# Patient Record
Sex: FEMALE | URBAN - METROPOLITAN AREA
[De-identification: names, ages, dates, MRNs, and addresses within clinical notes are randomized per-mention and may not be internally consistent; named-entity substitution may affect disease eponyms.]

---

## 2024-01-11 ENCOUNTER — OFFICE VISIT (OUTPATIENT)
Dept: URBAN - METROPOLITAN AREA CLINIC 2 | Facility: CLINIC | Age: 24
End: 2024-01-11

## 2024-02-19 ENCOUNTER — OV CON (OUTPATIENT)
Dept: URBAN - METROPOLITAN AREA CLINIC 2 | Facility: CLINIC | Age: 24
End: 2024-02-19

## 2024-04-15 ENCOUNTER — OV NP (OUTPATIENT)
Dept: URBAN - METROPOLITAN AREA CLINIC 2 | Facility: CLINIC | Age: 24
End: 2024-04-15
Payer: MEDICAID

## 2024-04-15 VITALS
SYSTOLIC BLOOD PRESSURE: 123 MMHG | WEIGHT: 222.8 LBS | TEMPERATURE: 98 F | HEART RATE: 97 BPM | BODY MASS INDEX: 38.04 KG/M2 | HEIGHT: 64 IN | DIASTOLIC BLOOD PRESSURE: 84 MMHG

## 2024-04-15 DIAGNOSIS — R12 HEARTBURN: ICD-10-CM

## 2024-04-15 PROCEDURE — 99203 OFFICE O/P NEW LOW 30 MIN: CPT | Performed by: NURSE PRACTITIONER

## 2024-04-15 RX ORDER — PANTOPRAZOLE SODIUM 40 MG/1
1 TABLET TABLET, DELAYED RELEASE ORAL TWICE DAILY
Qty: 60 | Refills: 1 | OUTPATIENT
Start: 2024-04-15

## 2024-04-15 NOTE — HPI-TODAY'S VISIT:
Very pleasant 23-year-old female seen today for persistent heartburn and reflux symptoms.  She did have severe heartburn while she was pregnant and symptoms have worsened.  Her baby is now 1-year-old.  She did go to ER recently.  She had a normal evaluation.  She was given Carafate and pantoprazole.  She has been taking this medication for 2 months and symptoms have persisted.  She does not smoke or drink alcohol.  She avoids foods that exacerbate her symptoms like lemonade.  However, even water will cause heartburn.  She also avoids NSAIDs.  She has epigastric pain that will radiate up through her chest and cause heartburn.  She denies any dysphagia.